# Patient Record
Sex: MALE | Race: WHITE | NOT HISPANIC OR LATINO
[De-identification: names, ages, dates, MRNs, and addresses within clinical notes are randomized per-mention and may not be internally consistent; named-entity substitution may affect disease eponyms.]

---

## 2024-03-26 ENCOUNTER — NON-APPOINTMENT (OUTPATIENT)
Age: 84
End: 2024-03-26

## 2024-04-01 ENCOUNTER — RESULT REVIEW (OUTPATIENT)
Age: 84
End: 2024-04-01

## 2024-04-01 ENCOUNTER — APPOINTMENT (OUTPATIENT)
Dept: ORTHOPEDIC SURGERY | Facility: CLINIC | Age: 84
End: 2024-04-01
Payer: COMMERCIAL

## 2024-04-01 ENCOUNTER — OUTPATIENT (OUTPATIENT)
Dept: OUTPATIENT SERVICES | Facility: HOSPITAL | Age: 84
LOS: 1 days | End: 2024-04-01
Payer: COMMERCIAL

## 2024-04-01 VITALS
SYSTOLIC BLOOD PRESSURE: 131 MMHG | HEIGHT: 66 IN | BODY MASS INDEX: 32.95 KG/M2 | WEIGHT: 205 LBS | DIASTOLIC BLOOD PRESSURE: 72 MMHG | HEART RATE: 158 BPM | OXYGEN SATURATION: 91 %

## 2024-04-01 DIAGNOSIS — Z86.79 PERSONAL HISTORY OF OTHER DISEASES OF THE CIRCULATORY SYSTEM: ICD-10-CM

## 2024-04-01 DIAGNOSIS — M16.12 UNILATERAL PRIMARY OSTEOARTHRITIS, LEFT HIP: ICD-10-CM

## 2024-04-01 DIAGNOSIS — Z86.39 PERSONAL HISTORY OF OTHER ENDOCRINE, NUTRITIONAL AND METABOLIC DISEASE: ICD-10-CM

## 2024-04-01 DIAGNOSIS — Z96.641 PRESENCE OF RIGHT ARTIFICIAL HIP JOINT: ICD-10-CM

## 2024-04-01 DIAGNOSIS — M17.12 UNILATERAL PRIMARY OSTEOARTHRITIS, LEFT KNEE: ICD-10-CM

## 2024-04-01 DIAGNOSIS — Z96.651 PRESENCE OF RIGHT ARTIFICIAL KNEE JOINT: ICD-10-CM

## 2024-04-01 DIAGNOSIS — M51.26 OTHER INTERVERTEBRAL DISC DISPLACEMENT, LUMBAR REGION: ICD-10-CM

## 2024-04-01 DIAGNOSIS — Z86.69 PERSONAL HISTORY OF OTHER DISEASES OF THE NERVOUS SYSTEM AND SENSE ORGANS: ICD-10-CM

## 2024-04-01 PROBLEM — Z00.00 ENCOUNTER FOR PREVENTIVE HEALTH EXAMINATION: Status: ACTIVE | Noted: 2024-04-01

## 2024-04-01 PROCEDURE — 99203 OFFICE O/P NEW LOW 30 MIN: CPT

## 2024-04-01 PROCEDURE — 73564 X-RAY EXAM KNEE 4 OR MORE: CPT | Mod: 26,LT

## 2024-04-01 PROCEDURE — 73502 X-RAY EXAM HIP UNI 2-3 VIEWS: CPT

## 2024-04-01 PROCEDURE — 73564 X-RAY EXAM KNEE 4 OR MORE: CPT

## 2024-04-01 PROCEDURE — 73502 X-RAY EXAM HIP UNI 2-3 VIEWS: CPT | Mod: 26,LT

## 2024-04-01 NOTE — DISCUSSION/SUMMARY
[de-identified] : I was present with the Fellow during the key portions of the history and exam. I discussed the case with the Fellow and agree with the findings and plan as documented in the [place] note, unless otherwise noted below.    83-year-old male with left lateral hip pain, secondary to trochanteric bursitis and mild left hip degenerative arthritis, and left knee pain secondary to degenerative arthritis. 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  Had a discussion with the patient that at this time, we should initiate conservative management for the left hip and knee pain. We recommended over-the-counter NSAIDs and Tylenol use as needed for the pain. We did offer physical therapy, but the patient is active in the gym and we will defer at this time.  We also discussed use of ice and heat as well as assistive devices as needed.  He will follow up with us in one year, at which we will include the imaging for the right CLIF and TKA. All questions answered to the patients satisfaction.

## 2024-04-01 NOTE — PHYSICAL EXAM
[Normal] : Gait: normal [de-identified] : Right hip: well healed posterior incision noted, hip 0-100, IR/ER 30/45, hip flex strength 5/5 Right knee: well-healed anterior incision, 0-130, slight varus/valgus gapping with stable endpoints, 3-5mm AP translation, nontender globally about the knee Left hip: 0-100, IR/ER 20/40, hip flex strength 4/5, tender over troch bursa, popliteal angle 60 degrees, positive Milad, abduction 20 degrees, negative stinchfield, negative hip grind Left knee: 0-130, no crepitus, no effusion noted, medial joint line tenderness, stable to varus/valgus Back: tender left SI joint, nontender lumbar paraspinal musculature [de-identified] :  I have reviewed X-rays of the left hip which include 4. They reveal mild degenerative arthritis with mild joint space narrowing and osteophyte formation. There is a right total hip arthroplasty in place, appropriately positioned without evidence of loosening. Lumbar degenerative changes with osteophytes noted.    I have reviewed X-rays of the left knee which include 4 views. They reveal moderate degenerative arthritis with joint space narrowing and osteophyte formation of the medial compartment, especially noted on Alcantar view. Patella at appropriate height, central position, no joint space narrowing noted.

## 2024-04-01 NOTE — HISTORY OF PRESENT ILLNESS
[de-identified] : 83-year-old male here for evaluation of left hip and left knee pain that's been ongoing for the last several months and worsening, it is intermittent nature. He says that he walks 1.5 miles to the gym twice a week and by the time he gets there, he gets quite a bit of aching. The left knee pain is in the medial knee and the left hip pain is in the buttock region and lateral thigh. He denies any radiation of pain, clicking, swelling, locking, loss of motion, or buckling to both knee and hip. He denies any brace use, cane, or walker use. He does have a history of right knee arthroplasty, and right total hip arthroplasty previously performed by Dr. Barreto. Tthese are doing well and he has no complaints at this time. Has not used oral medications, performed PT, or had injections in LLE.   Past medical history of hypertension, hyperlipidemia, neuropathy on gabapentin, hypothyroidism, DVT left lower extremity was on Xarelto for eight weeks, on baby aspirin twice a week. Surgical history, thyroidectomy, previous right knee and right total hip arthroplasty as previously discussed NKDA

## 2024-04-01 NOTE — REVIEW OF SYSTEMS
[Arthralgia] : arthralgia [Joint Pain] : joint pain [Joint Stiffness] : no joint stiffness [Joint Swelling] : no joint swelling [Fever] : no fever [Chills] : no chills